# Patient Record
Sex: FEMALE | Race: AMERICAN INDIAN OR ALASKA NATIVE | HISPANIC OR LATINO | Employment: UNEMPLOYED | URBAN - METROPOLITAN AREA
[De-identification: names, ages, dates, MRNs, and addresses within clinical notes are randomized per-mention and may not be internally consistent; named-entity substitution may affect disease eponyms.]

---

## 2021-08-12 ENCOUNTER — TELEPHONE (OUTPATIENT)
Dept: PSYCHIATRY | Facility: CLINIC | Age: 26
End: 2021-08-12

## 2021-08-12 NOTE — TELEPHONE ENCOUNTER
Behavorial Health Outpatient Intake Questions    Referred by: [facility]    Please advised interviewee that they need to answer all questions truthfully to allow for best care and any misrepresentations of information may affect their ability to be seen at this clinic   => Was this discussed? Yes     Behavorial Health Outpatient Intake History -     Presenting Problem (in patient's words):   Bipolar, PTSD, depression and anxiety. Had a baby 4 months ago. Patient states it's been a rough transition from not having a baby to motherhood. Patient states has been admitted 3 times after having the baby.    Are there any developmental disabilities? ? If yes, can they speak to you on the phone? If they are too limited to speak to you on phone, refer out Yes    Are you taking any psychiatric medications? Yes    => If yes, who prescribes? If yes, are they injectable   medications?   paliperidone palmitate ER (INVEGA SUSTENNA)  OXcarbazepine (TRILEPTAL)  paliperidone (INVEGA)      Does the patient have a language barrier or hearing impairment? No    Have you been treated at [facility] by a therapist or a doctor in the past? If yes, who? No    Has the patient been hospitalized for mental health? Yes   If yes, how long ago was last hospitalization and where was it?  July 2021- [place]    Do you actively use alcohol or marijuana or illegal substances? If yes, what and how much - refer out to Drug and alcohol treatment if use is excessive or daily use of illegal substances No concerns of substance abuse are reported.    Do you have a community treatment team or ? Yes,  through [facility]     Legal History-     Does the patient have any history of arrests, senior living/FCI time, or DUIs? No  If Yes-  1) What types of charges?  2) When were they last incarcerated?  3) Are they currently on parole or probation?    Minor Child-    Who has custody of the child?     Is there a custody agreement?     If  there is a custody agreement remind parent that they must bring a copy to the first appt or they will not be seen.     Intake Team, please check with provider before scheduling if flags come up such as:  - complex case  - legal history (other than DUI)  - communication barrier concerns are present  - if, in your judgment, this needs further review    ACCEPTED as a patient Yes  => Appointment Date: 08/16/2021 at 8:00 a.m with Dr. Otero     Referred Elsewhere? No    Name of Insurance Co: [employer]  Insurance ID# [id number]; [id number]  Insurance Phone #  If ins is primary or secondary  If patient is a minor, parents information such as Name, D.O.B of guarantor.

## 2022-11-30 PROBLEM — J06.9 VIRAL URI WITH COUGH: Status: ACTIVE | Noted: 2022-11-30

## 2023-01-16 ENCOUNTER — TELEMEDICINE (OUTPATIENT)
Dept: FAMILY MEDICINE CLINIC | Facility: CLINIC | Age: 28
End: 2023-01-16

## 2023-01-16 DIAGNOSIS — J06.9 VIRAL URI WITH COUGH: ICD-10-CM

## 2023-01-16 DIAGNOSIS — J45.909 ASTHMA, UNSPECIFIED ASTHMA SEVERITY, UNSPECIFIED WHETHER COMPLICATED, UNSPECIFIED WHETHER PERSISTENT: ICD-10-CM

## 2023-01-16 RX ORDER — ALBUTEROL SULFATE 2.5 MG/3ML
2.5 SOLUTION RESPIRATORY (INHALATION) EVERY 6 HOURS PRN
Qty: 3 ML | Refills: 0 | Status: SHIPPED | OUTPATIENT
Start: 2023-01-16

## 2023-01-16 RX ORDER — BENZONATATE 100 MG/1
100 CAPSULE ORAL 3 TIMES DAILY PRN
Qty: 20 CAPSULE | Refills: 0 | Status: SHIPPED | OUTPATIENT
Start: 2023-01-16

## 2023-01-16 NOTE — PROGRESS NOTES
COVID-19 Outpatient Progress Note    Assessment/Plan:    Problem List Items Addressed This Visit        Respiratory    Asthma    Relevant Medications    albuterol (2 5 mg/3 mL) 0 083 % nebulizer solution    Other Relevant Orders    Nebulizer    Nebulizer Supplies    RESOLVED: Viral URI with cough    Relevant Medications    benzonatate (TESSALON PERLES) 100 mg capsule      Disposition:     Patient is 7 days from onset of symptoms - testing for COVID/Flu offers little clinical utility at this time  Patient notes improvement of symptoms of wheezing following evaluation in the ED and no wheezing or difficulty breathing is noted by myself during our call  Refill for nebulizer, nebulizer supplies and albuterol nebulizer solution are provided for patient to have at home  Tessalon Perrles provided for symptomatic management of cough  Patient to follow up within 1 week if no improvement of symptoms is noted  Should follow up in 4-6 weeks for continued preventative asthma management  I have spent a total time of 15 minutes on the day of the encounter for this patient including       Encounter provider: Perla Colbert DO     Provider located at: [address] [place] [id number] [phone]     Recent Visits  No visits were found meeting these conditions  Showing recent visits within past 7 days and meeting all other requirements  Future Appointments  No visits were found meeting these conditions  Showing future appointments within next 150 days and meeting all other requirements     This virtual check-in was done via  [address] and patient was informed that this is a secure, HIPAA-compliant platform  She agrees to proceed  Patient agrees to participate in a virtual check in via telephone or video visit instead of presenting to the office to address urgent/immediate medical needs  Patient is aware this is a billable service   She acknowledged consent and understanding of privacy and security of the video platform  The patient has agreed to participate and understands they can discontinue the visit at any time  After connecting through [facility], the patient was identified by name and date of birth  Paz Lopez was informed that this was a telemedicine visit and that the exam was being conducted confidentially over secure lines  My office door was closed  No one else was in the room  Paz Lopez acknowledged consent and understanding of privacy and security of the telemedicine visit  I informed the patient that I have reviewed her record in Epic and presented the opportunity for her to ask any questions regarding the visit today  The patient agreed to participate  Verification of patient location:  Patient is located in the following state in which I hold an active license: [place]    Subjective:   Paz Lopez is a 28 y o  female who is concerned about COVID-19  Patient's symptoms include fatigue, nasal congestion, anosmia, loss of taste, cough, shortness of breath, chest tightness and headache  Patient denies fever, chills, rhinorrhea, sore throat, abdominal pain, nausea, vomiting, diarrhea and myalgias  - Date of symptom onset: 1/9/2023      COVID-19 vaccination status: Not vaccinated    Started Monday  No testing for COVID or Flu  Sick with fatigue and a really bad cough and had a asthma attack within the past week and needed the ambulance because nebulizer was broken at home  3rd asthma attack this year  She has been using her albuterol inhaler as needed  Taking honey and Theraflu for congestion and upper respiratory symptoms        Lab Results   Component Value Date    SARSCOV2 Positive (A) 01/06/2022    [facility] [facility] [address] Not Detected 03/04/2023       Review of Systems   Constitutional: Positive for fatigue  Negative for chills and fever  HENT: Positive for congestion  Negative for rhinorrhea and sore throat      Respiratory: Positive for cough, chest tightness and shortness of breath  Gastrointestinal: Negative for abdominal pain, diarrhea, nausea and vomiting  Musculoskeletal: Negative for myalgias  Neurological: Positive for headaches  Current Outpatient Medications on File Prior to Visit   Medication Sig   • albuterol (Ventolin HFA) 90 mcg/act inhaler Inhale 2 puffs every 6 (six) hours as needed for wheezing   • Brotapp DM 15-1-5 MG/5ML LIQD take 20 milliliters by mouth every 4 hours if needed for congestion or allergies (Patient not taking: Reported on 8/4/2022)   • cetirizine (ZyrTEC) 10 mg tablet Take 1 tablet (10 mg total) by mouth daily   • cholecalciferol (VITAMIN D3) 1,000 units tablet Take 2 tablets (2,000 Units total) by mouth daily   • Diclofenac Sodium (VOLTAREN) 1 % Apply 2 g topically 4 (four) times a day (Patient not taking: Reported on 1/30/2023)   • Estarylla 0 25-35 MG-MCG per tablet TAKE 1 TABLET BY MOUTH DAILY (Patient not taking: Reported on 8/4/2022)   • fluticasone (FLONASE) 50 mcg/act nasal spray 1 spray into each nostril daily   • fluticasone (Flovent HFA) 220 mcg/act inhaler Inhale 1 puff 2 (two) times a day Rinse mouth after use  Flovent 220 1 puff BID  May use up to 2 puffs twice daily while recovering from [place]  Will reassess usage in 1 week     • hydrOXYzine HCL (ATARAX) 25 mg tablet Take 1 tablet (25 mg total) by mouth every 6 (six) hours as needed for anxiety   • meclizine (ANTIVERT) 12 5 MG tablet Take 1 tablet (12 5 mg total) by mouth 3 (three) times a day as needed for dizziness   • metoclopramide (REGLAN) 10 mg tablet Take 1 tablet (10 mg total) by mouth every 6 (six) hours as needed (n/v or headache) (Patient not taking: Reported on 1/30/2023)   • ondansetron (ZOFRAN-ODT) 4 mg disintegrating tablet Take 1 tablet (4 mg total) by mouth every 6 (six) hours as needed for nausea or vomiting (Patient not taking: Reported on 1/30/2023)   • polyethylene glycol (MIRALAX) 17 g packet Take 17 g by mouth daily (Patient not taking: Reported on 1/30/2023)       Objective: There were no vitals taken for this visit  Physical Exam  Constitutional:       General: She is not in acute distress  Appearance: Normal appearance  She is ill-appearing  HENT:      Right Ear: External ear normal       Left Ear: External ear normal       Mouth/Throat:      Mouth: Mucous membranes are moist    Eyes:      General: No scleral icterus  Right eye: No discharge  Left eye: No discharge  Conjunctiva/sclera: Conjunctivae normal    Pulmonary:      Comments: No conversational dyspnea noted  Musculoskeletal:      Cervical back: Normal range of motion  Neurological:      Mental Status: She is alert     Psychiatric:         Mood and Affect: Mood normal          Behavior: Behavior normal        Paul Herndon DO

## 2023-01-29 PROBLEM — J06.9 VIRAL URI WITH COUGH: Status: RESOLVED | Noted: 2022-11-30 | Resolved: 2023-01-29

## 2023-01-30 ENCOUNTER — CONSULT (OUTPATIENT)
Dept: NEUROLOGY | Facility: CLINIC | Age: 28
End: 2023-01-30

## 2023-01-30 VITALS
TEMPERATURE: 98.2 F | BODY MASS INDEX: 47.09 KG/M2 | HEART RATE: 80 BPM | DIASTOLIC BLOOD PRESSURE: 78 MMHG | HEIGHT: 66 IN | SYSTOLIC BLOOD PRESSURE: 120 MMHG | WEIGHT: 293 LBS

## 2023-01-30 DIAGNOSIS — G93.2 IIH (IDIOPATHIC INTRACRANIAL HYPERTENSION): Primary | ICD-10-CM

## 2023-01-30 DIAGNOSIS — R51.9 HEADACHE: ICD-10-CM

## 2023-01-30 DIAGNOSIS — R42 VERTIGO: ICD-10-CM

## 2023-01-30 DIAGNOSIS — G47.19 EXCESSIVE DAYTIME SLEEPINESS: ICD-10-CM

## 2023-01-30 DIAGNOSIS — R51.9 ACUTE NONINTRACTABLE HEADACHE, UNSPECIFIED HEADACHE TYPE: ICD-10-CM

## 2023-01-30 RX ORDER — ACETAZOLAMIDE 500 MG/1
500 CAPSULE, EXTENDED RELEASE ORAL 2 TIMES DAILY
Qty: 180 CAPSULE | Refills: 3 | Status: SHIPPED | OUTPATIENT
Start: 2023-01-30

## 2023-01-30 NOTE — PATIENT INSTRUCTIONS
Patient Instructions:  -start diamox 500 mg twice daily   If your headaches and visual changes are not improving on this dose please contact the office  -please schedule an appointment with an ophthalmologist as soon as you are able  -please schedule your sleep study  -please schedule your lumbar puncture  -please try to lose about 6% of your current body weight to help with your headache symptoms  -follow up in 3 months
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
## 2023-02-21 NOTE — NURSING NOTE
Unable to reach patient after multiple  attempts, sent instructions and directions in e-mail that is linked to this account  See message below  Good Morning Mrs Brandon Alexandra,     You are scheduled on  3/1/23 @1 pm for diagnostic lumbar puncture  You are to come @ 11: 30 am  to [address] at [address] [place] [address], [address] 1st floor and present  to [employer]  They will get you changed for your procedure, update medical information, and draw blood work  A platelet and clotting time are needed the day of the procedure to assure your safety and healing post procedure  You may eat a light breakfast, drink fluids and take all your medications that are prescribed to you  Please do not take any Aspirin and also be cautious of any over the counter medication please check if Aspirin is one of the ingredients (Tylenol, Motrin and Aleve are fine to take)  If not on fluid restrictions, please increase your fluid intake 3 days prior to the procedure  For the procedure you will be on your stomach, we will use an Xray to assist in accessing your cerebral spinal fluid once the area in your lower back is cleaned and you get a local anesthetic  Once the fluid has been collected, we will send them for testing per your ordering provider instructions  A Band-aid will be applied to the site and you will be assisted back onto your stretcher so you may go back to surgical services to be monitored for the allotted time usually 1 hour  Upon discharge you will need a ride home so please bring a  for this study  The night of or the day following you may experience soreness at your lower back and a headache, these are normal and expected  Your discharge instructions will be to rest, hydrate with fluids (caffeine also helps with the headache if present) and take over the counter medication such as Tylenol, Motrin or Aleve        Please feel free to call if any other questions or concerns should rodney Farooq RN  [facility] Radiology RN  [address] [facility]  [phone] (Office)  [phone] (Fax)  [email or web address]

## 2023-02-22 NOTE — NURSING NOTE
Called patient to complete consult and go over instructions, patient is scheduled on 3/1/23   for diagnostic lumbar puncture  Verified allergies and no current anticoagulant medication present  Diet, medication instructions (taking own meds prior to test), also went over with patient that as of today with hold any ASA or ASA products till the date of the procedure and need for  was explained  Also went over instructions of location, time and date of procedure, of location and time ambulatory procedure unit  Also reviewed the procedure itself and answered any questions  Explained also post recovery instructions  Patient made aware that she may call if any other questions that may arise to the myself, gave them my contact information  Information was also sent via e-mail to verified address [email or web address], see message below  Good Morning Mrs Fabiola Dolan,      You are scheduled on  3/1/23 @1 pm for diagnostic lumbar puncture        You are to come @ 11: 30 am  to [address] at [address] [place] [address], [address] 1[address] floor and present  to [employer]  They will get you changed for your procedure, update medical information, and draw blood work  A platelet and clotting time are needed the day of the procedure to assure your safety and healing post procedure       You may eat a light breakfast, drink fluids and take all your medications that are prescribed to you  Please do not take any Aspirin and also be cautious of any over the counter medication please check if Aspirin is one of the ingredients (Tylenol, Motrin and Aleve are fine to take)     If not on fluid restrictions, please increase your fluid intake 3 days prior to the procedure      For the procedure you will be on your stomach, we will use an Xray to assist in accessing your cerebral spinal fluid once the area in your lower back is cleaned and you get a local anesthetic   Once the fluid has been collected, we will send them for testing per your ordering provider instructions  A Band-aid will be applied to the site and you will be assisted back onto your stretcher so you may go back to surgical services to be monitored for the allotted time usually 1 hour       Upon discharge you will need a ride home so please bring a  for this study  The night of or the day following you may experience soreness at your lower back and a headache, these are normal and expected  Your discharge instructions will be to rest, hydrate with fluids (caffeine also helps with the headache if present) and take over the counter medication such as Tylenol, Motrin or Aleve        Please feel free to call if any other questions or concerns should arise  [address] [place] [address]  Radiology RN  [address] [place] [address]  [phone] (Office)  [phone] (Fax)  [email or web address]

## 2023-03-01 ENCOUNTER — HOSPITAL ENCOUNTER (OUTPATIENT)
Dept: RADIOLOGY | Facility: HOSPITAL | Age: 28
Discharge: HOME | End: 2023-03-01
Attending: STUDENT IN AN ORGANIZED HEALTH CARE EDUCATION/TRAINING PROGRAM | Admitting: RADIOLOGY

## 2023-03-01 VITALS
RESPIRATION RATE: 18 BRPM | SYSTOLIC BLOOD PRESSURE: 139 MMHG | DIASTOLIC BLOOD PRESSURE: 75 MMHG | TEMPERATURE: 97.3 F | HEART RATE: 70 BPM | OXYGEN SATURATION: 100 % | BODY MASS INDEX: 49.26 KG/M2 | HEIGHT: 66 IN

## 2023-03-01 DIAGNOSIS — G93.2 IIH (IDIOPATHIC INTRACRANIAL HYPERTENSION): ICD-10-CM

## 2023-03-01 LAB
APPEARANCE CSF: CLEAR
EXT PREGNANCY TEST URINE: NEGATIVE
EXT. CONTROL: NORMAL
GLUCOSE CSF-MCNC: 71 MG/DL (ref 50–80)
INR PPP: 0.99 (ref 0.84–1.19)
PLATELET # BLD AUTO: 280 THOUSANDS/UL (ref 149–390)
PMV BLD AUTO: 9.7 FL (ref 8.9–12.7)
PROT CSF-MCNC: 31 MG/DL (ref 15–45)
PROTHROMBIN TIME: 13.3 SECONDS (ref 11.6–14.5)
TOTAL CELLS COUNTED BLD: NO
TUBE # CSF: 4
WBC # CSF AUTO: 0 /UL (ref 0–5)

## 2023-03-01 RX ORDER — ACETAMINOPHEN 325 MG/1
650 TABLET ORAL EVERY 6 HOURS PRN
Status: DISCONTINUED | OUTPATIENT
Start: 2023-03-01 | End: 2023-03-02 | Stop reason: HOSPADM

## 2023-03-02 ENCOUNTER — NURSE TRIAGE (OUTPATIENT)
Dept: OTHER | Facility: OTHER | Age: 28
End: 2023-03-02

## 2023-03-02 DIAGNOSIS — G93.2 IIH (IDIOPATHIC INTRACRANIAL HYPERTENSION): ICD-10-CM

## 2023-03-02 NOTE — TELEPHONE ENCOUNTER
Regarding: Post Op Issue  ----- Message from [facility] sent at 3/2/2023  1:02 AM EST -----  "I just had a lumbar puncture today and I am in a lot of pain and I dont know how to sleep   Please help "

## 2023-03-02 NOTE — TELEPHONE ENCOUNTER
Reason for Disposition  • [1] SEVERE post-op pain (e g , excruciating, pain scale 8-10) AND [2] not controlled with pain medications    Answer Assessment - Initial Assessment Questions  1  SYMPTOM: "What's the main symptom you're concerned about?" (e g , pain, fever, vomiting)      Lower right  back pain     2  ONSET: "When did S/S  start?"     One hour ago     3  SURGERY: "What surgery was performed?"      Lumbar puncture     4  DATE of SURGERY: "When was surgery performed?"      3/1/23    5  ANESTHESIA: " What type of anesthesia did you have?" (e g , general, spinal, epidural, local)      Local     6  PAIN: "Is there any pain?" If Yes, ask: "How bad is it?"  (Scale 1-10; or mild, moderate, severe)      9/10    7  FEVER: "Do you have a fever?" If Yes, ask: "What is your temperature, how was it measured, and when did it start?"     Denies    8  VOMITING: "Is there any vomiting?" If yes, ask: "How many times?"      Denies    9  BLEEDING: "Is there any bleeding?" If Yes, ask: "How much?" and "Where?"     Denies    10   OTHER SYMPTOMS: "Do you have any other symptoms?" (e g , drainage from wound, painful urination, constipation)       Chills, headache 9/10    Ibuprofen 20 ml PO at 40 minutes ago    Protocols used: POST-OP SYMPTOMS AND QUESTIONS-[facility]

## 2023-03-02 NOTE — TELEPHONE ENCOUNTER
Pt requesting refill of Acetazolamide  Chart review shows several refills available  Called [facility] to confirm and left Message with call back #

## 2023-03-03 ENCOUNTER — NURSE TRIAGE (OUTPATIENT)
Dept: OTHER | Facility: OTHER | Age: 28
End: 2023-03-03

## 2023-03-03 RX ORDER — ACETAZOLAMIDE 500 MG/1
500 CAPSULE, EXTENDED RELEASE ORAL 2 TIMES DAILY
Qty: 180 CAPSULE | Refills: 3 | Status: SHIPPED | OUTPATIENT
Start: 2023-03-03

## 2023-03-03 NOTE — TELEPHONE ENCOUNTER
Reason for Disposition  • Weakness of a leg or foot (e g , unable to bear weight, dragging foot)    Answer Assessment - Initial Assessment Questions  1  ONSET: "When did the pain begin?"       Post LP 3/1    2  LOCATION: "Where does it hurt?" (upper, mid or lower back)      Lower back    3  SEVERITY: "How bad is the pain?"  (e g , Scale 1-10; mild, moderate, or severe)    - MILD (1-3): doesn't interfere with normal activities     - MODERATE (4-7): interferes with normal activities or awakens from sleep     - SEVERE (8-10): excruciating pain, unable to do any normal activities       10/10- no relief with OTC medication or flexeril    4  PATTERN: "Is the pain constant?" (e g , yes, no; constant, intermittent)       Constant    5  RADIATION: "Does the pain shoot into your legs or elsewhere?"      Neck into front of head    6  CAUSE:  "What do you think is causing the back pain?"       Possibly straining to get out of bed as mattress is directly on the floor     7  BACK OVERUSE:  "Any recent lifting of heavy objects, strenuous work or exercise?"      Straining to get out of bed    8  MEDICATIONS: "What have you taken so far for the pain?" (e g , nothing, acetaminophen, NSAIDS)      Tylenol, motrin, flexeril    9  NEUROLOGIC SYMPTOMS: "Do you have any weakness, numbness, or problems with bowel/bladder control?"      Weakness in lower extremities    10  OTHER SYMPTOMS: "Do you have any other symptoms?" (e g , fever, abdominal pain, burning with urination, blood in urine)        Neck stiffness started two hours ago    11   PREGNANCY: "Is there any chance you are pregnant?" (e g , yes, no; LMP)        Denies    Protocols used: BACK PAIN-ADULT-

## 2023-03-03 NOTE — PROGRESS NOTES
Ms Saúl Durbin is a 27 y/o female who presented [facility] on [facility]23 for a lumbar puncture to evaluate for idiopathic intracranial hypertension  The patient had an uneventful lumbar puncture  Please see procedure note for complete details  Summary of procedure: opening pressure 28 cm H2O, 34 5 ml of CSF was removed, and closing pressure was 16 cmH2O  The patient called Radiology this am with complaints of back pain  Upon speaking with Latonia Cortez, she developed severe back pain Wednesday night after her lumbar puncture  She went to [facility] ER where she was evaluated and prescribed a muscle relaxant  She noted Wednesday night she had a moderate headache, but that has been steadily improving  She notes the muscle relaxants have only mildly assisted with her back pain complaints  She currently denies any fevers, chills, skin erythema at the puncture site, drainage from the puncture site  The only other complaint she has is "muscle cramping"  Currently the patient does not appear to have an infection, and her headache is improving  I encouraged the patient to continue Tylenol or NSAIDS as needed, continue to remain hydrated, and use the muscle relaxants as prescribed  I asked the patient to continue supportive care at this time, however, if her pain persists / worsens, she develops any signs of infection ( fevers / chills / skin erythema), or develops any new symptoms to return to the ED or see her primary care physician  The patient verbalized her understanding of the above and concurred      [facility]

## 2023-03-03 NOTE — TELEPHONE ENCOUNTER
Chart reviewed:  Diamox script was sent to [facility] pharmacy in [facility] on 1/30/23    Pending script below requesting be sent to [facility] pharmacy in [address]  Rx pending   Pls review and sign off if agreeable

## 2023-03-03 NOTE — TELEPHONE ENCOUNTER
Regarding: Severe pain  after lumbar puncture  ----- Message from Madeleine Woodruff sent at 3/3/2023  6:15 PM EST -----  " Had a lumbar puncture gonsalon Wednesday and I am in extreme pain in my lower back"

## 2023-03-04 LAB — BACTERIA CSF CULT: NO GROWTH

## 2023-03-06 ENCOUNTER — PATIENT MESSAGE (OUTPATIENT)
Dept: NEUROLOGY | Facility: CLINIC | Age: 28
End: 2023-03-06

## 2023-03-06 ENCOUNTER — APPOINTMENT (EMERGENCY)
Dept: CT IMAGING | Facility: HOSPITAL | Age: 28
End: 2023-03-06

## 2023-03-06 ENCOUNTER — HOSPITAL ENCOUNTER (EMERGENCY)
Facility: HOSPITAL | Age: 28
Discharge: HOME | End: 2023-03-06
Attending: EMERGENCY MEDICINE | Admitting: EMERGENCY MEDICINE

## 2023-03-06 VITALS
HEART RATE: 92 BPM | RESPIRATION RATE: 20 BRPM | OXYGEN SATURATION: 100 % | TEMPERATURE: 97.9 F | SYSTOLIC BLOOD PRESSURE: 142 MMHG | DIASTOLIC BLOOD PRESSURE: 67 MMHG

## 2023-03-06 DIAGNOSIS — G97.1 POST LUMBAR PUNCTURE HEADACHE: Primary | ICD-10-CM

## 2023-03-06 LAB
ANION GAP SERPL CALCULATED.3IONS-SCNC: 5 MMOL/L (ref 4–13)
APTT PPP: 29 SECONDS (ref 23–37)
BASOPHILS # BLD AUTO: 0.03 THOUSANDS/ÂΜL (ref 0–0.1)
BASOPHILS NFR BLD AUTO: 0 % (ref 0–1)
BUN SERPL-MCNC: 17 MG/DL (ref 5–25)
CALCIUM SERPL-MCNC: 10.1 MG/DL (ref 8.4–10.2)
CHLORIDE SERPL-SCNC: 104 MMOL/L (ref 96–108)
CO2 SERPL-SCNC: 27 MMOL/L (ref 21–32)
CREAT SERPL-MCNC: 0.71 MG/DL (ref 0.6–1.3)
EOSINOPHIL # BLD AUTO: 0.26 THOUSAND/ÂΜL (ref 0–0.61)
EOSINOPHIL NFR BLD AUTO: 3 % (ref 0–6)
ERYTHROCYTE [DISTWIDTH] IN BLOOD BY AUTOMATED COUNT: 15.2 % (ref 11.6–15.1)
GFR SERPL CREATININE-BSD FRML MDRD: 116 ML/MIN/1.73SQ M
GLUCOSE SERPL-MCNC: 83 MG/DL (ref 65–140)
HCG SERPL QL: NEGATIVE
HCT VFR BLD AUTO: 42.2 % (ref 34.8–46.1)
HGB BLD-MCNC: 13.1 G/DL (ref 11.5–15.4)
IMM GRANULOCYTES # BLD AUTO: 0.03 THOUSAND/UL (ref 0–0.2)
IMM GRANULOCYTES NFR BLD AUTO: 0 % (ref 0–2)
INR PPP: 1 (ref 0.84–1.19)
LYMPHOCYTES # BLD AUTO: 2.67 THOUSANDS/ÂΜL (ref 0.6–4.47)
LYMPHOCYTES NFR BLD AUTO: 30 % (ref 14–44)
MCH RBC QN AUTO: 25 PG (ref 26.8–34.3)
MCHC RBC AUTO-ENTMCNC: 31 G/DL (ref 31.4–37.4)
MCV RBC AUTO: 80 FL (ref 82–98)
MONOCYTES # BLD AUTO: 0.38 THOUSAND/ÂΜL (ref 0.17–1.22)
MONOCYTES NFR BLD AUTO: 4 % (ref 4–12)
NEUTROPHILS # BLD AUTO: 5.55 THOUSANDS/ÂΜL (ref 1.85–7.62)
NEUTS SEG NFR BLD AUTO: 63 % (ref 43–75)
NRBC BLD AUTO-RTO: 0 /100 WBCS
PLATELET # BLD AUTO: 307 THOUSANDS/UL (ref 149–390)
PMV BLD AUTO: 9.6 FL (ref 8.9–12.7)
POTASSIUM SERPL-SCNC: 4.2 MMOL/L (ref 3.5–5.3)
PROTHROMBIN TIME: 13 SECONDS (ref 11.6–14.5)
RBC # BLD AUTO: 5.25 MILLION/UL (ref 3.81–5.12)
SODIUM SERPL-SCNC: 136 MMOL/L (ref 135–147)
WBC # BLD AUTO: 8.92 THOUSAND/UL (ref 4.31–10.16)

## 2023-03-06 RX ORDER — DROPERIDOL 2.5 MG/ML
2.5 INJECTION, SOLUTION INTRAMUSCULAR; INTRAVENOUS ONCE
Status: COMPLETED | OUTPATIENT
Start: 2023-03-06 | End: 2023-03-06

## 2023-03-06 RX ORDER — METOCLOPRAMIDE HYDROCHLORIDE 5 MG/ML
10 INJECTION INTRAMUSCULAR; INTRAVENOUS ONCE
Status: COMPLETED | OUTPATIENT
Start: 2023-03-06 | End: 2023-03-06

## 2023-03-06 RX ORDER — DIPHENHYDRAMINE HYDROCHLORIDE 50 MG/ML
25 INJECTION INTRAMUSCULAR; INTRAVENOUS ONCE
Status: COMPLETED | OUTPATIENT
Start: 2023-03-06 | End: 2023-03-06

## 2023-03-06 RX ADMIN — DROPERIDOL 2.5 MG: 2.5 INJECTION, SOLUTION INTRAMUSCULAR; INTRAVENOUS at 18:22

## 2023-03-06 RX ADMIN — DIPHENHYDRAMINE HYDROCHLORIDE 25 MG: 50 INJECTION, SOLUTION INTRAMUSCULAR; INTRAVENOUS at 17:07

## 2023-03-06 RX ADMIN — METOCLOPRAMIDE 10 MG: 5 INJECTION, SOLUTION INTRAMUSCULAR; INTRAVENOUS at 17:06

## 2023-03-06 NOTE — TELEPHONE ENCOUNTER
Recommend significantly increasing daily hydration and combination pills including caffeine like excedrin for her post-dural puncture headaches  If no improvement may need to consider blood patch

## 2023-03-06 NOTE — PROGRESS NOTES
Ms Wilmar Trinidad is a 27 y/o female who presented to [facility] on 3/1/23 for a lumbar puncture to  evaluate for idiopathic intracranial hypertension  The patient had an uneventful lumbar puncture  Please see procedure note for complete details  Summary of procedure: opening pressure 28 cm H2O, 34 5 ml of CSF was removed, and closing pressure was 16 cmH2O  Daryl Del Valle called again this am with complaints of a 9/10 headache which she describes as "pressure"  She denies any fevers, chills, skin erythema, or drainage from her puncture site  She notes since we last spoke on 3/3/23 ( see prior progress note)  that her lower back pain has drastically improved, but she is unable to tolerate the headache  She does note, some mild dizziness associated with the headache  She denies any additional acute complaints at this time  Over the weekend she continued to treat her back pain and headache conservatively, although her headache did not improve  At this time the patient likely will require a blood patch for a suspected spinal headache  Unfortunately, the Radiologist who performs blood patches is currently un-available  Due to the patient's persistent symptoms and new onset dizziness, I reccommended that the patient return to the ED for evaluation and a possible blood patch by anesthesia if possible / warranted  The patient verbalized her understanding and concurred       [facility]

## 2023-03-06 NOTE — Clinical Note
Case was discussed with JACKELINE and the patient's admission status was agreed to be Admission Status: observation status to the service of Dr Dutch Syed

## 2023-03-06 NOTE — TELEPHONE ENCOUNTER
Pt went to [facility] on 3/4/23  Pls see ed notes    Called pt and states that she went to  ed on 3/4/23  She was given a muscle relaxer  It helps for an hour or so but the pain comes back  She took naproxen and tylenol but nothing was helping  She is still not doing great  She still has extreme dizziness and HA, pain in the back of her head  It is hard for her to walk straight  States that her lower back pain is slightly better     Cb [phone], ok to leave a detailed message

## 2023-03-06 NOTE — TELEPHONE ENCOUNTER
Called and advised pt of all of the below  She verbalized clear understanding  Pt states that the one that did the LP recommended blood patch  She is just waiting a call to schedule blood patch
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
## 2023-03-07 ENCOUNTER — HOSPITAL ENCOUNTER (EMERGENCY)
Facility: HOSPITAL | Age: 28
Discharge: HOME | End: 2023-03-07
Attending: EMERGENCY MEDICINE

## 2023-03-07 ENCOUNTER — ANESTHESIA (OUTPATIENT)
Dept: ANESTHESIOLOGY | Facility: HOSPITAL | Age: 28
End: 2023-03-07

## 2023-03-07 ENCOUNTER — ANESTHESIA EVENT (OUTPATIENT)
Dept: ANESTHESIOLOGY | Facility: HOSPITAL | Age: 28
End: 2023-03-07

## 2023-03-07 VITALS
TEMPERATURE: 97.7 F | HEART RATE: 86 BPM | OXYGEN SATURATION: 100 % | RESPIRATION RATE: 18 BRPM | DIASTOLIC BLOOD PRESSURE: 89 MMHG | SYSTOLIC BLOOD PRESSURE: 110 MMHG

## 2023-03-07 DIAGNOSIS — G97.1 POST LUMBAR PUNCTURE HEADACHE: Primary | ICD-10-CM

## 2023-03-07 NOTE — ANESTHESIA PROCEDURE NOTES
Blood Patch:  Patient location during procedure: ED  Start time: 3/7/2023 1:40 PM  Reason for Blood Patch: spinal headache  Staffing:  Performed by:  Anesthesiologist   Preanesthetic Checklist:  Completed: patient identified, site marked, surgical consent, pre-op evaluation, timeout performed, IV checked, risks and benefits discussed, monitors and equipment checked and anesthesia consent given  Procedure Information:  Location of venous blood draw: arm (L)  Volume of blood injected: 20 mL  Patient position: sitting  Prep: Chloraprep and site prepped and draped  Patient monitoring: continuous pulse oximetry, blood pressure monitoring and EKG  Approach: midline  Injection technique: SEBAS air  Location: L4-5  Needle and Epidural Catheter:  Injection method: Touhy needle  Needle gauge: 20G  Needle length: 10 cm  Loss of resistance depth: 8 5 cm    Assessment:  Events: well tolerated
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
## 2023-03-07 NOTE — DISCHARGE INSTRUCTIONS
Return to the ER tomorrow for your blood patch  Let the staff know to contact Dr Esteban Carter when you arrive

## 2023-03-08 ENCOUNTER — TELEPHONE (OUTPATIENT)
Dept: SLEEP CENTER | Facility: CLINIC | Age: 28
End: 2023-03-08

## 2023-03-08 NOTE — TELEPHONE ENCOUNTER
----- Message from Jo-Ann Butler MD sent at 3/8/2023  2:45 PM EST -----  Approved    ----- Message -----  From: Aissatou Agee  Sent: 3/3/2023  12:06 PM EST  To: Sleep Medicine [place] Provider    This Ex Diagnostic sleep study needs approval      If approved please sign and return to clerical pool  If denied please include reasons why  Also provide alternative testing if warranted  Please sign and return to clerical pool

## 2023-03-24 ENCOUNTER — HOSPITAL ENCOUNTER (OUTPATIENT)
Dept: SLEEP CENTER | Facility: HOSPITAL | Age: 28
Discharge: HOME | End: 2023-03-24

## 2023-03-24 DIAGNOSIS — R51.9 HEADACHE: ICD-10-CM

## 2023-03-24 DIAGNOSIS — G47.19 EXCESSIVE DAYTIME SLEEPINESS: ICD-10-CM

## 2023-03-25 NOTE — PROGRESS NOTES
Sleep Study Documentation    Pre-Sleep Study       Sleep testing procedure explained to patient:YES    Patient napped prior to study:YES- less than 30 minutes  Napped after 2PM: no    Caffeine:Dayshift worker after 12PM   Caffeine use:YES- chocolate  6 to 18 ounces ice tea 12 ounces    Alcohol:Dayshift workers after 5PM: Alcohol use:NO    Typical day for patient:YES       Study Documentation    Sleep Study Indications: The patient is here for witnessed apneas, excessive daytime sleepiness, chronic/am headaches and BMI>30  Sleep Study: Diagnostic   Snore:Mild to occasionally moderate  Supplemental O2: no    O2 flow rate (L/min) range N/A  O2 flow rate (L/min) final N/A  Minimum SaO2 88  Baseline SaO2 93        Mode of Therapy:N/A    EKG abnormalities: no     EEG abnormalities: no    Sleep Study Recorded < 2 hours: N/A    Sleep Study Recorded > 2 hours but incomplete study: N/A    Sleep Study Recorded 6 hours but no sleep obtained: NO    Patient classification: unemployed       Post-Sleep Study    Medication used at bedtime or during sleep study:NO    Patient reports time it took to fall asleep:30 to 60 minutes    Patient reports waking up during study:3 or more times  Patient reports returning to sleep in 10 to 30 minutes  Patient reports sleeping 4 to 6 hours without dreaming  Patient reports sleep during study:typical    Patient rated sleepiness: Very sleepy or tired    PAP treatment:no

## 2023-03-30 ENCOUNTER — TELEPHONE (OUTPATIENT)
Dept: SLEEP CENTER | Facility: CLINIC | Age: 28
End: 2023-03-30

## 2023-03-30 NOTE — TELEPHONE ENCOUNTER
Per Dr Cholo Woodward sleep study shows mild BARNEY  Patient needs to schedule consult with Dr Cholo Woodward     Left call back message

## 2023-03-31 NOTE — TELEPHONE ENCOUNTER
Returned the patient's call   Explained sleep study results     Patient is going to look for a sleep specialist closer to her home

## 2023-04-19 ENCOUNTER — TELEPHONE (OUTPATIENT)
Dept: NEUROLOGY | Facility: CLINIC | Age: 28
End: 2023-04-19

## 2023-04-19 NOTE — TELEPHONE ENCOUNTER
"Patient called in with increase in Dizziness and Double vision, asking for a call back   # [phone]      I called and spoke with patient. Said last few days her Dizziness has increased to the point where she feels like she will \"pass out\"  and today has been having Double vision. Very Nauseous.   I asked if patient had a BP monitor at home. She does not.  Current meds :  Diamox 500 mg capsules takes 1 in the AM and 2 at HS.    I advised patient to go to closest ED for evaluation   "

## 2023-05-11 ENCOUNTER — TELEPHONE (OUTPATIENT)
Dept: NEUROLOGY | Facility: CLINIC | Age: 28
End: 2023-05-11

## 2023-05-11 NOTE — TELEPHONE ENCOUNTER
Pt left message   I am calling because I need to get referral for sleep specialist,so I was wondering if you can send that over    CB# [phone]    Called pt back and left message we got her message and will ask MD for sleep referral      Pt is asking for sleep medicine referral  Please advise

## 2023-05-15 DIAGNOSIS — G47.19 EXCESSIVE DAYTIME SLEEPINESS: Primary | ICD-10-CM

## 2023-05-15 DIAGNOSIS — R51.9 HEADACHE: ICD-10-CM

## 2023-05-15 NOTE — TELEPHONE ENCOUNTER
Called pt back and made her aware that referral for sleep medicine was placed,requested that along with sleep study be fax to [phone]  Faxed as requested